# Patient Record
Sex: FEMALE | Race: WHITE | Employment: FULL TIME | URBAN - METROPOLITAN AREA
[De-identification: names, ages, dates, MRNs, and addresses within clinical notes are randomized per-mention and may not be internally consistent; named-entity substitution may affect disease eponyms.]

---

## 2018-04-02 ENCOUNTER — APPOINTMENT (OUTPATIENT)
Dept: GENERAL RADIOLOGY | Age: 33
End: 2018-04-02
Attending: FAMILY MEDICINE
Payer: COMMERCIAL

## 2018-04-02 ENCOUNTER — HOSPITAL ENCOUNTER (OUTPATIENT)
Age: 33
Discharge: HOME | End: 2018-04-02
Attending: FAMILY MEDICINE
Payer: COMMERCIAL

## 2018-04-02 VITALS
SYSTOLIC BLOOD PRESSURE: 135 MMHG | BODY MASS INDEX: 38.32 KG/M2 | WEIGHT: 230 LBS | TEMPERATURE: 98 F | DIASTOLIC BLOOD PRESSURE: 96 MMHG | RESPIRATION RATE: 18 BRPM | HEART RATE: 80 BPM | OXYGEN SATURATION: 100 % | HEIGHT: 65 IN

## 2018-04-02 DIAGNOSIS — J45.21 MILD INTERMITTENT ASTHMATIC BRONCHITIS WITH ACUTE EXACERBATION: Primary | ICD-10-CM

## 2018-04-02 PROCEDURE — 99205 OFFICE O/P NEW HI 60 MIN: CPT

## 2018-04-02 PROCEDURE — 94640 AIRWAY INHALATION TREATMENT: CPT

## 2018-04-02 PROCEDURE — 99204 OFFICE O/P NEW MOD 45 MIN: CPT

## 2018-04-02 PROCEDURE — 71046 X-RAY EXAM CHEST 2 VIEWS: CPT | Performed by: FAMILY MEDICINE

## 2018-04-02 RX ORDER — IPRATROPIUM BROMIDE AND ALBUTEROL SULFATE 2.5; .5 MG/3ML; MG/3ML
3 SOLUTION RESPIRATORY (INHALATION) ONCE
Status: COMPLETED | OUTPATIENT
Start: 2018-04-02 | End: 2018-04-02

## 2018-04-02 RX ORDER — AZITHROMYCIN 250 MG/1
TABLET, FILM COATED ORAL
Qty: 1 PACKAGE | Refills: 0 | Status: SHIPPED | OUTPATIENT
Start: 2018-04-05 | End: 2018-04-02

## 2018-04-02 RX ORDER — PREDNISONE 20 MG/1
40 TABLET ORAL ONCE
Status: COMPLETED | OUTPATIENT
Start: 2018-04-02 | End: 2018-04-02

## 2018-04-02 RX ORDER — ALBUTEROL SULFATE 2.5 MG/3ML
5 SOLUTION RESPIRATORY (INHALATION) ONCE
Status: COMPLETED | OUTPATIENT
Start: 2018-04-02 | End: 2018-04-02

## 2018-04-02 RX ORDER — PREDNISONE 20 MG/1
20 TABLET ORAL 2 TIMES DAILY
Qty: 14 TABLET | Refills: 0 | Status: SHIPPED | OUTPATIENT
Start: 2018-04-03 | End: 2018-04-10

## 2018-04-02 RX ORDER — ALBUTEROL SULFATE 90 UG/1
2 AEROSOL, METERED RESPIRATORY (INHALATION) EVERY 4 HOURS PRN
Qty: 1 INHALER | Refills: 0 | Status: SHIPPED | OUTPATIENT
Start: 2018-04-02 | End: 2018-05-02

## 2018-04-02 RX ORDER — AZITHROMYCIN 250 MG/1
TABLET, FILM COATED ORAL
Qty: 1 PACKAGE | Refills: 0 | Status: SHIPPED | OUTPATIENT
Start: 2018-04-05 | End: 2018-05-01 | Stop reason: ALTCHOICE

## 2018-04-02 NOTE — ED INITIAL ASSESSMENT (HPI)
Pt presents today with c/o non-productive cough that began 1.5-2 weeks ago. Pt states that she has chest pain when she coughs. Pt denies any fevers.

## 2018-04-02 NOTE — ED PROVIDER NOTES
Patient Seen in: [facility]    History   Patient presents with:  Cough/URI    Stated Complaint: wheezing;cough;congestion x30 days    HPI    62-year-old female with a history of asthma and bronchitis presents with coughing, signs.  LAD: No lymphadenopathy. Heart: S1,S2 RRR, No murmur  Lungs: Scattered expiratory wheezes and rhonchi throughout. Skin: Warm and dry  Neuro: A&O x3.  No focal deficits      ED Course   Labs Reviewed - No data to display  FINDINGS:  Normal heart si R-0

## 2018-05-01 ENCOUNTER — OFFICE VISIT (OUTPATIENT)
Dept: INTERNAL MEDICINE CLINIC | Facility: CLINIC | Age: 33
End: 2018-05-01

## 2018-05-01 VITALS
SYSTOLIC BLOOD PRESSURE: 110 MMHG | DIASTOLIC BLOOD PRESSURE: 78 MMHG | WEIGHT: 239 LBS | OXYGEN SATURATION: 99 % | HEART RATE: 74 BPM | HEIGHT: 64.5 IN | TEMPERATURE: 98 F | BODY MASS INDEX: 40.31 KG/M2 | RESPIRATION RATE: 14 BRPM

## 2018-05-01 DIAGNOSIS — J45.20 MILD INTERMITTENT ASTHMA WITHOUT COMPLICATION: ICD-10-CM

## 2018-05-01 DIAGNOSIS — Z00.00 ROUTINE GENERAL MEDICAL EXAMINATION AT A HEALTH CARE FACILITY: ICD-10-CM

## 2018-05-01 DIAGNOSIS — Z12.4 ENCOUNTER FOR SCREENING FOR CERVICAL CANCER: ICD-10-CM

## 2018-05-01 DIAGNOSIS — Z11.3 SCREENING EXAMINATION FOR STD (SEXUALLY TRANSMITTED DISEASE): Primary | ICD-10-CM

## 2018-05-01 PROCEDURE — 99385 PREV VISIT NEW AGE 18-39: CPT | Performed by: INTERNAL MEDICINE

## 2018-05-01 PROCEDURE — 87624 HPV HI-RISK TYP POOLED RSLT: CPT | Performed by: INTERNAL MEDICINE

## 2018-05-01 PROCEDURE — 87591 N.GONORRHOEAE DNA AMP PROB: CPT | Performed by: INTERNAL MEDICINE

## 2018-05-01 PROCEDURE — 87491 CHLMYD TRACH DNA AMP PROBE: CPT | Performed by: INTERNAL MEDICINE

## 2018-05-01 NOTE — PROGRESS NOTES
Randee Baker is a 29year old female  Patient presents with:  Physical      HPI:   She has a hx of dysplasia and was lost to f/u. Last pap clear  She had only biopsies and monitering  She is refusing eval for asthma today, she does not want to pay for it. minor other things   HPI  NEURO: denies headaches or dizziness    EXAM:   /78 (BP Location: Left arm, Patient Position: Sitting, Cuff Size: large)   Pulse 74   Temp 98.2 °F (36.8 °C) (Oral)   Resp 14   Ht 64.5\"   Wt 239 lb   LMP 04/01/2018 (Exact

## 2018-05-03 ENCOUNTER — TELEPHONE (OUTPATIENT)
Dept: INTERNAL MEDICINE CLINIC | Facility: CLINIC | Age: 33
End: 2018-05-03

## 2018-05-03 NOTE — TELEPHONE ENCOUNTER
Spoke to pt, she wants to know if she will need to see gyne if her pap results come back normal.  Advise pap is still in process at this time. This is just FYI so we can let her know if she needs GYNE when we give her the results.

## 2018-05-09 ENCOUNTER — TELEPHONE (OUTPATIENT)
Dept: INTERNAL MEDICINE CLINIC | Facility: CLINIC | Age: 33
End: 2018-05-09

## 2018-05-09 NOTE — TELEPHONE ENCOUNTER
Took call from pt, she is having trouble with asthma. She is using rescue inhaler more than usual.  It's working but not like it normally does for her. She said she has some sputum production with cough in the am but not throughout the day.   Sputum was g

## 2018-05-10 ENCOUNTER — OFFICE VISIT (OUTPATIENT)
Dept: INTERNAL MEDICINE CLINIC | Facility: CLINIC | Age: 33
End: 2018-05-10

## 2018-05-10 VITALS
WEIGHT: 237.5 LBS | HEART RATE: 73 BPM | SYSTOLIC BLOOD PRESSURE: 118 MMHG | TEMPERATURE: 98 F | BODY MASS INDEX: 40.05 KG/M2 | OXYGEN SATURATION: 98 % | DIASTOLIC BLOOD PRESSURE: 78 MMHG | HEIGHT: 64.5 IN

## 2018-05-10 DIAGNOSIS — J45.21 MILD INTERMITTENT ASTHMA WITH ACUTE EXACERBATION: Primary | ICD-10-CM

## 2018-05-10 PROCEDURE — 99213 OFFICE O/P EST LOW 20 MIN: CPT | Performed by: NURSE PRACTITIONER

## 2018-05-10 RX ORDER — LORATADINE 10 MG/1
10 TABLET ORAL DAILY
Qty: 30 TABLET | Refills: 2 | Status: SHIPPED | OUTPATIENT
Start: 2018-05-10 | End: 2019-02-05

## 2018-05-10 RX ORDER — ALBUTEROL SULFATE 90 UG/1
AEROSOL, METERED RESPIRATORY (INHALATION) EVERY 6 HOURS PRN
COMMUNITY
End: 2020-04-07

## 2018-05-10 RX ORDER — PREDNISONE 20 MG/1
20 TABLET ORAL DAILY
Qty: 5 TABLET | Refills: 0 | Status: SHIPPED | OUTPATIENT
Start: 2018-05-10 | End: 2018-05-19 | Stop reason: ALTCHOICE

## 2018-05-10 NOTE — PROGRESS NOTES
Patient presents with:  Shortness Of Breath: Pt. is c/o wheezing x 1 month on and off and possibly allergies due to watery eyes      HPI:  Presents with approx 1 month history of SOB worse with activity, wheezing and coughing.  Stated wheezing is worse at n are pink and moist without exudate or drainage. PERRLA. Lymphadenopathy: No cervical adenopathy. Cardiovascular: Normal rate, regular rhythm. No murmur. Pulmonary/Chest: No respiratory distress.  Effort normal. Breath sounds noted with expiratory whee

## 2018-05-19 ENCOUNTER — OFFICE VISIT (OUTPATIENT)
Dept: INTERNAL MEDICINE CLINIC | Facility: CLINIC | Age: 33
End: 2018-05-19

## 2018-05-19 VITALS
WEIGHT: 238 LBS | SYSTOLIC BLOOD PRESSURE: 110 MMHG | HEART RATE: 80 BPM | BODY MASS INDEX: 40 KG/M2 | DIASTOLIC BLOOD PRESSURE: 80 MMHG | RESPIRATION RATE: 16 BRPM | TEMPERATURE: 97 F

## 2018-05-19 DIAGNOSIS — J01.90 ACUTE SINUSITIS, RECURRENCE NOT SPECIFIED, UNSPECIFIED LOCATION: ICD-10-CM

## 2018-05-19 DIAGNOSIS — J45.901 ACUTE EXACERBATION OF ASTHMA WITH ALLERGIC RHINITIS: Primary | ICD-10-CM

## 2018-05-19 PROCEDURE — 99213 OFFICE O/P EST LOW 20 MIN: CPT | Performed by: INTERNAL MEDICINE

## 2018-05-19 RX ORDER — MONTELUKAST SODIUM 10 MG/1
10 TABLET ORAL NIGHTLY
Qty: 30 TABLET | Refills: 2 | Status: SHIPPED | OUTPATIENT
Start: 2018-05-19 | End: 2019-02-05

## 2018-05-19 RX ORDER — AMOXICILLIN AND CLAVULANATE POTASSIUM 875; 125 MG/1; MG/1
1 TABLET, FILM COATED ORAL 2 TIMES DAILY
Qty: 20 TABLET | Refills: 0 | Status: SHIPPED | OUTPATIENT
Start: 2018-05-19 | End: 2018-06-05 | Stop reason: ALTCHOICE

## 2018-05-19 RX ORDER — FLUTICASONE PROPIONATE AND SALMETEROL 500; 50 UG/1; UG/1
1 POWDER RESPIRATORY (INHALATION) 2 TIMES DAILY
Qty: 14 EACH | Refills: 0 | COMMUNITY
Start: 2018-05-19 | End: 2018-06-05

## 2018-05-19 NOTE — PROGRESS NOTES
Radha Beard is a 29year old female.  To F/U from last visit regarding asthma  HPI:    Interim history:she has a hx of URI and allergy induced asthma, intermittent treatment only, since childhood, exacerbation this past month w/URI and then continued rhin (Oral)   Resp 16   Wt 238 lb   LMP 05/03/2018 (Exact Date)   BMI 40.22 kg/m²   GENERAL: well developed, well nourished,in no apparent distress  SKIN: no rashes,no suspicious lesions  HEENT: atraumatic, normocephalic,ears and throat are clear, no pallor or

## 2018-06-05 ENCOUNTER — OFFICE VISIT (OUTPATIENT)
Dept: INTERNAL MEDICINE CLINIC | Facility: CLINIC | Age: 33
End: 2018-06-05

## 2018-06-05 VITALS
HEART RATE: 67 BPM | DIASTOLIC BLOOD PRESSURE: 84 MMHG | RESPIRATION RATE: 14 BRPM | HEIGHT: 64.5 IN | SYSTOLIC BLOOD PRESSURE: 108 MMHG | WEIGHT: 243 LBS | BODY MASS INDEX: 40.98 KG/M2 | TEMPERATURE: 99 F | OXYGEN SATURATION: 99 %

## 2018-06-05 DIAGNOSIS — J45.20 MILD INTERMITTENT ASTHMA WITHOUT COMPLICATION: ICD-10-CM

## 2018-06-05 DIAGNOSIS — R05.9 COUGH: Primary | ICD-10-CM

## 2018-06-05 PROCEDURE — 99213 OFFICE O/P EST LOW 20 MIN: CPT | Performed by: INTERNAL MEDICINE

## 2018-06-05 RX ORDER — FLUTICASONE PROPIONATE AND SALMETEROL 250; 50 UG/1; UG/1
1 POWDER RESPIRATORY (INHALATION) 2 TIMES DAILY
Qty: 1 EACH | Refills: 0 | Status: SHIPPED | OUTPATIENT
Start: 2018-06-05 | End: 2019-02-05

## 2018-06-05 RX ORDER — PANTOPRAZOLE SODIUM 40 MG/1
40 TABLET, DELAYED RELEASE ORAL
Qty: 30 TABLET | Refills: 0 | Status: SHIPPED | OUTPATIENT
Start: 2018-06-05 | End: 2019-02-05

## 2018-06-05 NOTE — PATIENT INSTRUCTIONS
Tips to Control Acid Reflux    To control acid reflux, you’ll need to make some basic diet and lifestyle changes. The simple steps outlined below may be all you’ll need to ease discomfort. Watch what you eat  · Avoid fatty foods and spicy foods.   · Eat Symptoms of reflux include burning, pressure or sharp pain in the upper abdomen or mid to lower chest. The pain can spread to the neck, back, or shoulder.  There may be belching, an acid taste in the back of the throat, chronic cough, or sore throat or hoar Take an antacid 30-60 minutes after eating and at bedtime, but not at the same time as an acid blocker. Try not to take medicines such as ibuprofen and aspirin.  If you are taking aspirin for your heart or other medical reasons, talk to your healthcare pro · Don’t just prop your head on several pillows. This increases pressure on your stomach. It can make GERD worse. Watch your eating habits  Certain foods may increase the acid in your stomach or relax the lower esophageal sphincter.  This makes GERD more li Antacids work to weaken the acid in your stomach and can give you quick relief. You can buy many of them with no prescription. Antacids can be high in sodium. This may be a problem if you have high blood pressure. Some antacids also have aluminum.  This marcia Don’t take aspirin without your provider’s approval. And don’t take a nonsteroidal anti-inflammatory drug (NSAID), such as ibuprofen. These reduce the protective lining of your stomach. This can lead to more GERD symptoms.  Check with your provider or pharm You and your healthcare provider can work together to find the treatment options that best ease your symptoms. These may include lifestyle changes, medicine, and possibly surgery.   Many people find their GERD symptoms decrease when they eat small frequent

## 2018-06-05 NOTE — PROGRESS NOTES
Karen Elliott is a 29year old female.  To F/U from last visit regarding cough /asthma  HPI:    Interim history:we treatead 2 weeks ago w/course of abx, advair high dose and singulari, flonase and claritin  Her ACT is much better,- went from 13 to  23, her GI: denies abdominal pain and denies heartburn, change in appetite or bm's  NEURO: denies headaches or dizziness    EXAM:   /84 (BP Location: Left arm, Patient Position: Sitting, Cuff Size: large)   Pulse 67   Temp 98.6 °F (37 °C) (Oral)   Resp 14 results can occur. Regular sampling is recommended to minimize false   negative results.  [facility] REMOVED]    Case Report Gynecologic Cytology                              Case: [id number]                                  Authorizing Provider:  Malinda Harding Tips to Control Acid Reflux    To control acid reflux, you’ll need to make some basic diet and lifestyle changes. The simple steps outlined below may be all you’ll need to ease discomfort. Watch what you eat  · Avoid fatty foods and spicy foods.   · Ea Symptoms of reflux include burning, pressure or sharp pain in the upper abdomen or mid to lower chest. The pain can spread to the neck, back, or shoulder.  There may be belching, an acid taste in the back of the throat, chronic cough, or sore throat or hoar Take an antacid 30-60 minutes after eating and at bedtime, but not at the same time as an acid blocker. Try not to take medicines such as ibuprofen and aspirin.  If you are taking aspirin for your heart or other medical reasons, talk to your healthcare pro · Don’t just prop your head on several pillows. This increases pressure on your stomach. It can make GERD worse. Watch your eating habits  Certain foods may increase the acid in your stomach or relax the lower esophageal sphincter.  This makes GERD more li Antacids work to weaken the acid in your stomach and can give you quick relief. You can buy many of them with no prescription. Antacids can be high in sodium. This may be a problem if you have high blood pressure. Some antacids also have aluminum.  This marcia Don’t take aspirin without your provider’s approval. And don’t take a nonsteroidal anti-inflammatory drug (NSAID), such as ibuprofen. These reduce the protective lining of your stomach. This can lead to more GERD symptoms.  Check with your provider or pharm You and your healthcare provider can work together to find the treatment options that best ease your symptoms. These may include lifestyle changes, medicine, and possibly surgery.   Many people find their GERD symptoms decrease when they eat small frequent

## 2019-02-05 ENCOUNTER — OFFICE VISIT (OUTPATIENT)
Dept: INTERNAL MEDICINE CLINIC | Facility: CLINIC | Age: 34
End: 2019-02-05
Payer: COMMERCIAL

## 2019-02-05 VITALS
OXYGEN SATURATION: 98 % | BODY MASS INDEX: 41.01 KG/M2 | RESPIRATION RATE: 16 BRPM | WEIGHT: 243.19 LBS | HEART RATE: 80 BPM | HEIGHT: 64.5 IN | TEMPERATURE: 98 F | SYSTOLIC BLOOD PRESSURE: 122 MMHG | DIASTOLIC BLOOD PRESSURE: 80 MMHG

## 2019-02-05 DIAGNOSIS — Z30.011 BCP (BIRTH CONTROL PILLS) INITIATION: Primary | ICD-10-CM

## 2019-02-05 PROCEDURE — 99213 OFFICE O/P EST LOW 20 MIN: CPT | Performed by: INTERNAL MEDICINE

## 2019-02-05 RX ORDER — NORGESTIMATE AND ETHINYL ESTRADIOL 0.25-0.035
1 KIT ORAL DAILY
Qty: 3 PACKAGE | Refills: 3 | Status: SHIPPED | OUTPATIENT
Start: 2019-02-05 | End: 2020-04-03

## 2019-02-05 NOTE — PROGRESS NOTES
Upton Friend is a 34year old female  Patient presents with:  Contraception: Wants a Script until May 2019      HPI:   She would like a short term Rx for OCPs  Her former partner is coming back temporarily  She has been on OCPs in the past and had some di cells present    General Categorization Negative for intraepithelial lesion or malignancy       Final Diagnosis Comment       Negative for intraepithelial lesion or malignancy        HPV High Risk mRNA       Negative  Normal        Recommendations/Comments types were placed in this encounter. Meds & Refills for this Visit:  Requested Prescriptions     Signed Prescriptions Disp Refills   • Norgestimate-Eth Estradiol (SPRINTEC 28) 0.25-35 MG-MCG Oral Tab 3 Package 3     Sig: Take 1 tablet by mouth daily.

## 2020-04-03 RX ORDER — NORGESTIMATE AND ETHINYL ESTRADIOL 0.25-0.035
KIT ORAL
Qty: 28 TABLET | Refills: 0 | Status: SHIPPED | OUTPATIENT
Start: 2020-04-03 | End: 2020-05-01

## 2020-04-07 DIAGNOSIS — J45.20 MILD INTERMITTENT ASTHMA WITHOUT COMPLICATION: Primary | ICD-10-CM

## 2020-04-07 NOTE — TELEPHONE ENCOUNTER
Did the patient contact the pharmacy directly?:  Yes they told her to call us    Is patient out of meds or supply very low? : 1 week left about    Medication Requested:  Albuterol Sulfate HFA (PROAIR HFA) 108 (90 Base) MCG/ACT Inhalation Aero Soln    Dose:

## 2020-04-08 NOTE — TELEPHONE ENCOUNTER
Last OV relevant to medication: 6/5/2018 - Asthma, 2/5/2019 - BC  Last refill date: 4/2/2018     #/refills: 1 inh/0  When pt was asked to return for OV: n/a  Upcoming appt/reason: 6/20/2020 - PE

## 2020-04-14 RX ORDER — ALBUTEROL SULFATE 90 UG/1
2 AEROSOL, METERED RESPIRATORY (INHALATION) EVERY 6 HOURS PRN
Qty: 1 INHALER | Refills: 0 | Status: SHIPPED | OUTPATIENT
Start: 2020-04-14 | End: 2021-06-08

## 2020-05-01 RX ORDER — NORGESTIMATE AND ETHINYL ESTRADIOL 0.25-0.035
KIT ORAL
Qty: 84 TABLET | Refills: 0 | Status: SHIPPED | OUTPATIENT
Start: 2020-05-01 | End: 2020-07-21

## 2020-07-20 DIAGNOSIS — Z30.41 ORAL CONTRACEPTIVE USE: ICD-10-CM

## 2020-07-20 DIAGNOSIS — Z30.011 BCP (BIRTH CONTROL PILLS) INITIATION: Primary | ICD-10-CM

## 2020-07-21 RX ORDER — NORGESTIMATE AND ETHINYL ESTRADIOL 0.25-0.035
KIT ORAL
Qty: 84 TABLET | Refills: 0 | Status: SHIPPED | OUTPATIENT
Start: 2020-07-21 | End: 2021-06-08

## 2020-11-19 ENCOUNTER — MED REC SCAN ONLY (OUTPATIENT)
Dept: INTERNAL MEDICINE CLINIC | Facility: CLINIC | Age: 35
End: 2020-11-19

## 2021-05-11 ENCOUNTER — MED REC SCAN ONLY (OUTPATIENT)
Dept: INTERNAL MEDICINE CLINIC | Facility: CLINIC | Age: 36
End: 2021-05-11

## 2021-06-08 ENCOUNTER — OFFICE VISIT (OUTPATIENT)
Dept: INTERNAL MEDICINE CLINIC | Facility: CLINIC | Age: 36
End: 2021-06-08
Payer: COMMERCIAL

## 2021-06-08 VITALS
BODY MASS INDEX: 35.99 KG/M2 | SYSTOLIC BLOOD PRESSURE: 110 MMHG | OXYGEN SATURATION: 98 % | WEIGHT: 210.81 LBS | DIASTOLIC BLOOD PRESSURE: 72 MMHG | HEART RATE: 83 BPM | HEIGHT: 64.37 IN | TEMPERATURE: 98 F | RESPIRATION RATE: 14 BRPM

## 2021-06-08 DIAGNOSIS — L29.2 VULVAR PRURITUS: ICD-10-CM

## 2021-06-08 DIAGNOSIS — J45.20 MILD INTERMITTENT ASTHMA WITHOUT COMPLICATION: ICD-10-CM

## 2021-06-08 DIAGNOSIS — Z30.41 ORAL CONTRACEPTIVE USE: ICD-10-CM

## 2021-06-08 DIAGNOSIS — F32.A ANXIETY AND DEPRESSION: ICD-10-CM

## 2021-06-08 DIAGNOSIS — F41.9 ANXIETY AND DEPRESSION: ICD-10-CM

## 2021-06-08 DIAGNOSIS — Z11.3 SCREENING FOR STD (SEXUALLY TRANSMITTED DISEASE): ICD-10-CM

## 2021-06-08 DIAGNOSIS — Z00.00 ROUTINE GENERAL MEDICAL EXAMINATION AT A HEALTH CARE FACILITY: Primary | ICD-10-CM

## 2021-06-08 PROCEDURE — 3078F DIAST BP <80 MM HG: CPT | Performed by: INTERNAL MEDICINE

## 2021-06-08 PROCEDURE — 3074F SYST BP LT 130 MM HG: CPT | Performed by: INTERNAL MEDICINE

## 2021-06-08 PROCEDURE — 99395 PREV VISIT EST AGE 18-39: CPT | Performed by: INTERNAL MEDICINE

## 2021-06-08 PROCEDURE — 87480 CANDIDA DNA DIR PROBE: CPT | Performed by: INTERNAL MEDICINE

## 2021-06-08 PROCEDURE — 87510 GARDNER VAG DNA DIR PROBE: CPT | Performed by: INTERNAL MEDICINE

## 2021-06-08 PROCEDURE — 99000 SPECIMEN HANDLING OFFICE-LAB: CPT | Performed by: INTERNAL MEDICINE

## 2021-06-08 PROCEDURE — 87660 TRICHOMONAS VAGIN DIR PROBE: CPT | Performed by: INTERNAL MEDICINE

## 2021-06-08 PROCEDURE — 99213 OFFICE O/P EST LOW 20 MIN: CPT | Performed by: INTERNAL MEDICINE

## 2021-06-08 PROCEDURE — 3008F BODY MASS INDEX DOCD: CPT | Performed by: INTERNAL MEDICINE

## 2021-06-08 RX ORDER — NORGESTIMATE AND ETHINYL ESTRADIOL 0.25-0.035
1 KIT ORAL DAILY
Qty: 84 TABLET | Refills: 3 | Status: SHIPPED | OUTPATIENT
Start: 2021-06-08

## 2021-06-08 RX ORDER — ALBUTEROL SULFATE 90 UG/1
2 AEROSOL, METERED RESPIRATORY (INHALATION) EVERY 6 HOURS PRN
Qty: 1 EACH | Refills: 1 | Status: SHIPPED | OUTPATIENT
Start: 2021-06-08 | End: 2021-06-10

## 2021-06-08 NOTE — PATIENT INSTRUCTIONS
Get your labs done. You should be fasting for at least 10 hours. You may drink water up until the time of your appt. If you take a multivitamin with Biotin or any biotin product it should be held for 3 days prior to getting your labs done.   If you use Edw
49865 Exp Problem Focused - Mod. Complex

## 2021-06-08 NOTE — PROGRESS NOTES
HPI:   Margot Trivedi is a 37year old female who presents for a complete physical exam. . also intermittent extrinsic asthma and URI induced, we enterntained the thought of LRPD in the past and started PPI but lost to f/u.  Also was on singulair and advair a dysplasia, resolved on last pap, no hx of STds  OCPs uses off and on depending on when she sees her partner in [place], long distance relationship for years  Sexually active: yes  Exercise: no         REVIEW OF SYSTEMS:     GENERAL denies night sweats, weight l no rashes,no suspicious lesions.  Lentigo appearance of 4x3 mm flat light brown macule right cheek  HEENT: atraumatic, normocephalic,ears and throat are clear  EYES:PERRLA, EOMI, ,conjunctiva are clear, no scleral icterus  NECK: supple,no adenopathy,no brui CASCADE; Future  - HIV AG AB COMBO; Future  - CHLAMYDIA/GONOCOCCUS BY PCR; Future  - HEPATITIS B SURFACE ANTIBODY; Future  - HEPATITIS B SURFACE ANTIGEN; Future  - HCV ANTIBODY; Future    4.  Oral contraceptive use  - Norgestimate-Eth Estradiol (ESTARYLLA)

## 2021-06-10 DIAGNOSIS — J45.20 MILD INTERMITTENT ASTHMA WITHOUT COMPLICATION: ICD-10-CM

## 2021-06-10 RX ORDER — ALBUTEROL SULFATE 90 UG/1
2 AEROSOL, METERED RESPIRATORY (INHALATION) EVERY 6 HOURS PRN
Qty: 3 EACH | Refills: 0 | Status: SHIPPED | OUTPATIENT
Start: 2021-06-10

## 2021-06-15 PROBLEM — F41.8 DEPRESSION WITH ANXIETY: Status: ACTIVE | Noted: 2021-06-15

## 2021-06-15 NOTE — PATIENT INSTRUCTIONS
Bupropion HCl 24 HR Extended Release Tablet 300 mg  Uses  This medicine is used for the following purposes:  · attention deficit hyperactivity disorder  · depression  · stop smoking  Instructions  Swallow the medicine without crushing or chewing it.   Ismael Stewards doctor if there are any signs of confusion or unusual changes in behavior. Tell the doctor or pharmacist if you are pregnant, planning to be pregnant, or breastfeeding. Ask your pharmacist if this medicine can interact with any of your other medicines.  B help right away if you notice any of these more serious side effects:  · chest pain  · fast or irregular heart beats  · dilation of the pupils  · seizures  · shortness of breath  A few people may have an allergic reactions to this medicine.  Symptoms can in

## 2021-06-15 NOTE — PROGRESS NOTES
Camila Huber is a 37year old female  Patient presents with:   Follow - Up: Mental Health Update      HPI:   I saw her last week for a PE and she mentioned she needed help for recurrent anxiety /depressin and we schduled a dedicated OV to that for today MG Oral Tab Take 200 mg by mouth every 6 (six) hours as needed.         Patient Active Problem List:     Antonio     Mild intermittent asthma without complication     Social History:  Social History    Tobacco Use      Smoking status: Never Smoker mg  Uses  This medicine is used for the following purposes:  · attention deficit hyperactivity disorder  · depression  · stop smoking  Instructions  Swallow the medicine without crushing or chewing it. This medicine may be taken with or without food.   Try unusual changes in behavior. Tell the doctor or pharmacist if you are pregnant, planning to be pregnant, or breastfeeding. Ask your pharmacist if this medicine can interact with any of your other medicines.  Be sure to tell them about all the medicines yo serious side effects:  · chest pain  · fast or irregular heart beats  · dilation of the pupils  · seizures  · shortness of breath  A few people may have an allergic reactions to this medicine.  Symptoms can include difficulty breathing, skin rash, itching,

## 2021-06-23 ENCOUNTER — MED REC SCAN ONLY (OUTPATIENT)
Dept: INTERNAL MEDICINE CLINIC | Facility: CLINIC | Age: 36
End: 2021-06-23

## 2021-07-15 ENCOUNTER — TELEPHONE (OUTPATIENT)
Dept: INTERNAL MEDICINE CLINIC | Facility: CLINIC | Age: 36
End: 2021-07-15

## 2021-07-15 PROBLEM — F51.01 PRIMARY INSOMNIA: Status: ACTIVE | Noted: 2021-07-15

## 2021-07-15 NOTE — PROGRESS NOTES
Makenzie Trivedi is a 37year old female. To F/U from last vis it regarding depressive episode w/anxiety  HPI:    Interim history:was here 1 month ago with c/o recurrence of anxiety and depression.  Was withdrawing and isolating and feeling anxious and having Per EMS, patient was walking and tripped over his feet. No LOC, per pt's nurse patient is at his baseline, a&ox1.   how to arrange medications when she moves which won't be until January.  She wonders how she will tie up all of her loose ends in order to move  She mentions frequently how much she has on her plate  She states she is firm and nonconflicted regarding her mo

## 2021-07-15 NOTE — TELEPHONE ENCOUNTER
Patient scheduled a virtual visit for her short term follow up. Virtual/Telephone Consent    Danelle Genna verbally consents to a Virtual/Telephone Check-In service on 9/11/21.   Patient understands and accepts financial responsibility for any deductib

## 2021-08-19 DIAGNOSIS — F41.8 DEPRESSION WITH ANXIETY: ICD-10-CM

## 2021-08-19 RX ORDER — BUPROPION HYDROCHLORIDE 300 MG/1
300 TABLET ORAL DAILY
Qty: 30 TABLET | Refills: 1 | Status: CANCELLED | OUTPATIENT
Start: 2021-08-19

## 2021-08-20 NOTE — TELEPHONE ENCOUNTER
Last OV relevant to medication: 7/15/21  Last refill date: 7/15/21     #/refills: 30/1  When pt was asked to return for OV: 2 months  Upcoming appt/reason: 9/11/21, 2 month followup  Was pt informed of any over due labs: yes, Alexandrot sent

## 2021-08-21 RX ORDER — BUPROPION HYDROCHLORIDE 300 MG/1
300 TABLET ORAL DAILY
Qty: 30 TABLET | Refills: 0 | Status: SHIPPED | OUTPATIENT
Start: 2021-08-21

## 2021-09-11 PROBLEM — F41.8 DEPRESSION WITH ANXIETY: Status: RESOLVED | Noted: 2021-06-15 | Resolved: 2021-09-11

## 2021-09-11 NOTE — PROGRESS NOTES
This visit is conducted using Telemedicine with live, interactive video and audio. Patient has been referred to the [facility] website at [email or web address] to review the yearly Consent to Treat document.     Patient understands and accepts financial res intermittent asthma without complication     Depression with anxiety     Primary insomnia

## 2021-10-04 ENCOUNTER — TELEPHONE (OUTPATIENT)
Dept: INTERNAL MEDICINE CLINIC | Facility: CLINIC | Age: 36
End: 2021-10-04

## 2021-10-04 ENCOUNTER — NURSE ONLY (OUTPATIENT)
Dept: INTERNAL MEDICINE CLINIC | Facility: CLINIC | Age: 36
End: 2021-10-04
Payer: COMMERCIAL

## 2021-10-04 ENCOUNTER — PATIENT MESSAGE (OUTPATIENT)
Dept: INTERNAL MEDICINE CLINIC | Facility: CLINIC | Age: 36
End: 2021-10-04

## 2021-10-04 DIAGNOSIS — Z11.1 ENCOUNTER FOR PPD TEST: Primary | ICD-10-CM

## 2021-10-04 DIAGNOSIS — Z11.1 ENCOUNTER FOR PPD SKIN TEST READING: ICD-10-CM

## 2021-10-04 PROCEDURE — 86580 TB INTRADERMAL TEST: CPT | Performed by: INTERNAL MEDICINE

## 2021-10-04 NOTE — TELEPHONE ENCOUNTER
See notes below. Pt scheduled nurse visit for PPD. Pending PPD for approval.  Pt sent form in Patient Message encounter.   PE done in June

## 2021-10-04 NOTE — TELEPHONE ENCOUNTER
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 In June I had a

## 2021-10-04 NOTE — TELEPHONE ENCOUNTER
Form printed. Completed as able per 6/8/21 PE.   To Dr. Oli Worthy for signature  Pt coming today at 1:00PM for TB test.

## 2021-10-04 NOTE — TELEPHONE ENCOUNTER
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 She had a physical in June and also needs a TB test.  Should we schedule an OV or a nurse visit?    Please adv

## 2021-10-05 ENCOUNTER — TELEPHONE (OUTPATIENT)
Dept: INTERNAL MEDICINE CLINIC | Facility: CLINIC | Age: 36
End: 2021-10-05

## 2021-10-06 ENCOUNTER — NURSE ONLY (OUTPATIENT)
Dept: INTERNAL MEDICINE CLINIC | Facility: CLINIC | Age: 36
End: 2021-10-06
Payer: COMMERCIAL

## 2021-10-06 NOTE — TELEPHONE ENCOUNTER
Pt here for TB read  Pt does not have fax number  Pt will come and  form sometime this week  Form to be signed and place in front for   To  for signature

## 2021-10-06 NOTE — TELEPHONE ENCOUNTER
Patient can not come in tomorrow. She will come in as planned today and will bring the fax number for the form.

## 2021-10-06 NOTE — TELEPHONE ENCOUNTER
See my [employer] message to pt. PSR - Please call pt if she can r/s nurse visit to tomorrow before 1:00PM. Thanks! Or if can only come today, find out fax number we can send form to once Dr. Priyanka Willis signs.

## 2021-10-08 ENCOUNTER — MED REC SCAN ONLY (OUTPATIENT)
Dept: INTERNAL MEDICINE CLINIC | Facility: CLINIC | Age: 36
End: 2021-10-08

## 2021-12-22 ENCOUNTER — TELEPHONE (OUTPATIENT)
Dept: INTERNAL MEDICINE CLINIC | Facility: CLINIC | Age: 36
End: 2021-12-22

## 2021-12-22 ENCOUNTER — TELEMEDICINE (OUTPATIENT)
Dept: INTERNAL MEDICINE CLINIC | Facility: CLINIC | Age: 36
End: 2021-12-22
Payer: COMMERCIAL

## 2021-12-22 VITALS — BODY MASS INDEX: 35 KG/M2 | HEIGHT: 64.37 IN

## 2021-12-22 DIAGNOSIS — Z20.822 EXPOSURE TO COVID-19 VIRUS: Primary | ICD-10-CM

## 2021-12-22 DIAGNOSIS — J45.20 MILD INTERMITTENT ASTHMA WITHOUT COMPLICATION: ICD-10-CM

## 2021-12-22 PROCEDURE — 99213 OFFICE O/P EST LOW 20 MIN: CPT | Performed by: NURSE PRACTITIONER

## 2021-12-22 NOTE — PATIENT INSTRUCTIONS
Call central scheduling at [phone] to schedule your COVID test.    Quarantine for 14 days. Follow all CDC precautions such as frequent handwashing, wearing a mask, etc.    Go to ER if you develop SOB, oxygen saturation <92% persistently, or CP.     Florentino Herrera notify your family and friends with whom you have had close contact recently (starting 2 days before you first had symptoms). What counts as close contact?     * You were within 6 feet of someone who has COVID-19 for at least 15 minutes  * You provided transportation, ridesharing, or taxis. 2. Monitor your symptoms carefully. If your symptoms get worse, call your healthcare provider immediately. 3. Get rest and stay hydrated.    4. If you have a medical appointment, call the healthcare provider ahead o passed since recovery defined as resolution of fever without the use of fever-reducing medications; and  · Improvement in respiratory symptoms (e.g., cough, shortness of breath); and  · At least 10 days have passed since symptoms first appeared OR if asymp plasma is very similar to donating blood. Deirdre Navarro (a large blood research institute in [facility] and one of [facility]’s blood product suppliers) is coordinating plasma donations.     If you would be interested in donating your plasma to help treat o fog or trouble concentrating   Headaches Sleeping difficulties   Anxiety or depression Loss of taste or smell   Chest pain  Palpitations Light headedness  Muscle Pain   Increased Heart Rate Tingling, numbness, or burning sensation   Shortness of breath Victoriano Jorgensen

## 2021-12-22 NOTE — PROGRESS NOTES
[address] [place] [address]  verbally consents to a [employer] visit on 12/22/21. Patient has been referred to the [facility] website at [email or web address] to review the yearly Consent to Treat document.     P • CERVICAL DYSPLASIA    • Depression    • Depression with anxiety 6/15/2021   • Fracture of ankle    • Mild dysplasia of cervix 12/24/2014   • Mild intermittent asthma without complication 6/1/1564      Social History:  Social History    Tobacco Use The patient was also advised of the potential privacy & security concerns related to the telehealth platform. Lastly, the patient confirmed that they were in [place].      Please note that the following visit was completed using two-way, real-time inte

## 2021-12-22 NOTE — TELEPHONE ENCOUNTER
Virtual/Telephone Consent    Lissa Jefferson verbally {consents to a Virtual/Telephone Check-In service on 12/22/21. Patient understands and accepts financial responsibility for any deductible, co-insurance and/or co-pays associated with this service.     Pt

## 2022-02-19 ENCOUNTER — TELEPHONE (OUTPATIENT)
Dept: INTERNAL MEDICINE CLINIC | Facility: CLINIC | Age: 37
End: 2022-02-19

## 2022-02-21 RX ORDER — BUPROPION HYDROCHLORIDE 300 MG/1
300 TABLET ORAL DAILY
Qty: 90 TABLET | Refills: 0 | Status: SHIPPED | OUTPATIENT
Start: 2022-02-21

## 2022-02-21 NOTE — TELEPHONE ENCOUNTER
Called pt and informed her a follow up is needed. 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 I informed her on the below information. Informed pt I will reach back out. Pt stated she has been getting regular refills monthly. As documented below. I called the pharmacy to confirm their RX sent over   Pharmacy stated a refill was sent on 01/18/2021 and picked up on 01/19/2021  More recently a refill was picked up on 02/19/2022.   Last documented in Epic is 8/21/2021  Pharmacy is faxing over refills for review

## 2022-02-21 NOTE — TELEPHONE ENCOUNTER
Left message to pt to call back, need to verify if pt still taking bupropion. Per ov 9-11-21:   Can discontinue bupropion if she would like.         Please review refill protocol failed/ no protocol  Requested Prescriptions   Pending Prescriptions Disp Refills    BUPROPION 300 MG Oral Tablet 24 Hr [Pharmacy Med Name: BUPROPION XL 300MG TABLETS] 90 tablet 0     Sig: TAKE 1 TABLET(300 MG) BY MOUTH DAILY        There is no refill protocol information for this order              Last OV relevant to medication: 9-11-21  Last refill date: 8-21-21   #/refills: 30  When pt was asked to return for OV: no  Upcoming appt/reason: none  Was pt informed of any over due: no  Recent labs: n/a

## 2022-02-21 NOTE — TELEPHONE ENCOUNTER
Patient returned call and confirmed she is still taking Bupropion 300mg daily. Patient is requesting a 90 day supply with 1 refill.

## 2022-02-22 NOTE — TELEPHONE ENCOUNTER
Pt made appt for physical and med check. Calling insurance to see if she can do her physical early as she [place] be [place] of [place] this [place] when her physical will be due. Pt stated she will call back to update up.  appt made for 3/14/2022 with Dr Christiano Solis

## 2022-03-14 ENCOUNTER — TELEPHONE (OUTPATIENT)
Dept: INTERNAL MEDICINE CLINIC | Facility: CLINIC | Age: 37
End: 2022-03-14

## 2022-03-14 NOTE — TELEPHONE ENCOUNTER
Called and spoke with Caitie Lizarraga at [facility], she states it looks like July prescription was inputted incorrectly with 6 refills instead of 1. No more refills remain on that prescription, just the most recent sent 2/21/22 for #90.

## 2022-03-14 NOTE — TELEPHONE ENCOUNTER
Please call [facility] in [place]   We never gave a Rx for bupropion that I can see after august 2021 and she has been getting refills up until February 2022

## 2022-03-25 ENCOUNTER — TELEPHONE (OUTPATIENT)
Dept: INTERNAL MEDICINE CLINIC | Facility: CLINIC | Age: 37
End: 2022-03-25

## 2022-03-25 NOTE — TELEPHONE ENCOUNTER
Care Everywhere Records Received    From [address] Provider: Dr Reddy General    Speciality:Neurotology    Type of Record: OV on 3/18/22

## 2022-03-28 RX ORDER — NORGESTIMATE AND ETHINYL ESTRADIOL 0.25-0.035
KIT ORAL
Qty: 84 TABLET | Refills: 0 | Status: SHIPPED | OUTPATIENT
Start: 2022-03-28

## 2022-03-28 NOTE — TELEPHONE ENCOUNTER
Gynecology Medication Protocol Passed 03/27/2022 08:06 AM    PASS-PENDING LAST PAP WNL--VIA MANUAL LOOKUP    Physical or Pelvic/Breast in past 12 or next 3 mos--VIA MANUAL LOOKUP     No future appointments.   F/U in 6/14/22

## 2022-05-17 DIAGNOSIS — F41.8 DEPRESSION WITH ANXIETY: ICD-10-CM

## 2022-05-17 DIAGNOSIS — Z11.3 SCREENING FOR STD (SEXUALLY TRANSMITTED DISEASE): ICD-10-CM

## 2022-05-17 DIAGNOSIS — Z00.00 ANNUAL PHYSICAL EXAM: Primary | ICD-10-CM

## 2022-05-17 RX ORDER — BUPROPION HYDROCHLORIDE 300 MG/1
300 TABLET ORAL DAILY
Qty: 90 TABLET | Refills: 0 | Status: SHIPPED | OUTPATIENT
Start: 2022-05-17

## 2022-05-17 NOTE — TELEPHONE ENCOUNTER
PLEASE SEE ADDITIONAL NOTE:      Did the patient contact the pharmacy directly?:  Yes - no refills    Is patient out of meds or supply very low?:  Very low    Medication Requested:  buPROPion Oral Tablet 24 Hr    Dose:  300 MG    Is patient requesting a 30 or 90 day supply?:  90    Pharmacy name and phone # or location:  [facility] [address], [address], [phone], [phone]    Is the patient due for an appointment?:  Physical scheduled for 8/22/22  (if so, please schedule appt)    Additional Notes:  Patient is in [place] until August and scheduled her physical at that time.   Please send one 90 day supply to the [facility] in [place], [place]

## 2022-08-22 ENCOUNTER — OFFICE VISIT (OUTPATIENT)
Dept: INTERNAL MEDICINE CLINIC | Facility: CLINIC | Age: 37
End: 2022-08-22
Payer: COMMERCIAL

## 2022-08-22 VITALS
HEIGHT: 64.72 IN | DIASTOLIC BLOOD PRESSURE: 68 MMHG | OXYGEN SATURATION: 98 % | SYSTOLIC BLOOD PRESSURE: 116 MMHG | RESPIRATION RATE: 16 BRPM | BODY MASS INDEX: 37.17 KG/M2 | TEMPERATURE: 97 F | HEART RATE: 83 BPM | WEIGHT: 220.38 LBS

## 2022-08-22 DIAGNOSIS — Z12.4 CERVICAL CANCER SCREENING: ICD-10-CM

## 2022-08-22 DIAGNOSIS — Z00.00 ROUTINE GENERAL MEDICAL EXAMINATION AT A HEALTH CARE FACILITY: Primary | ICD-10-CM

## 2022-08-22 DIAGNOSIS — F41.8 DEPRESSION WITH ANXIETY: ICD-10-CM

## 2022-08-22 DIAGNOSIS — J45.20 MILD INTERMITTENT ASTHMA WITHOUT COMPLICATION: ICD-10-CM

## 2022-08-22 PROCEDURE — 99395 PREV VISIT EST AGE 18-39: CPT | Performed by: INTERNAL MEDICINE

## 2022-08-22 PROCEDURE — 3008F BODY MASS INDEX DOCD: CPT | Performed by: INTERNAL MEDICINE

## 2022-08-22 PROCEDURE — 99000 SPECIMEN HANDLING OFFICE-LAB: CPT | Performed by: INTERNAL MEDICINE

## 2022-08-22 PROCEDURE — 3078F DIAST BP <80 MM HG: CPT | Performed by: INTERNAL MEDICINE

## 2022-08-22 PROCEDURE — 99213 OFFICE O/P EST LOW 20 MIN: CPT | Performed by: INTERNAL MEDICINE

## 2022-08-22 PROCEDURE — 3074F SYST BP LT 130 MM HG: CPT | Performed by: INTERNAL MEDICINE

## 2022-08-22 RX ORDER — NORGESTIMATE AND ETHINYL ESTRADIOL 0.25-0.035
1 KIT ORAL DAILY
Qty: 84 TABLET | Refills: 3 | Status: SHIPPED | OUTPATIENT
Start: 2022-08-22

## 2022-08-22 RX ORDER — BUPROPION HYDROCHLORIDE 300 MG/1
300 TABLET ORAL DAILY
Qty: 90 TABLET | Refills: 1 | Status: SHIPPED | OUTPATIENT
Start: 2022-08-22

## 2022-08-25 ENCOUNTER — MED REC SCAN ONLY (OUTPATIENT)
Dept: INTERNAL MEDICINE CLINIC | Facility: CLINIC | Age: 37
End: 2022-08-25

## 2022-09-06 LAB — HPV I/H RISK 1 DNA SPEC QL NAA+PROBE: NEGATIVE

## 2022-10-27 RX ORDER — NORGESTIMATE AND ETHINYL ESTRADIOL 0.25-0.035
1 KIT ORAL DAILY
Qty: 84 TABLET | Refills: 3 | OUTPATIENT
Start: 2022-10-27

## (undated) DIAGNOSIS — Z30.41 ORAL CONTRACEPTIVE USE: ICD-10-CM

## (undated) DIAGNOSIS — F41.8 DEPRESSION WITH ANXIETY: ICD-10-CM

## (undated) NOTE — LETTER
ASTHMA ACTION PLAN for Sharyl Osler     : 1985     Date: 2021  Provider:  Yvonne Ibrahim MD  Phone for doctor or clinic: [facility],  [address] [place] [address], [address] [place] [address] Rhea Weippe [phone]

## (undated) NOTE — LETTER
ASTHMA ACTION PLAN for Vale Enrique     : 1985     Date: 2018  Provider:  Katy Lynch MD  Phone for doctor or clinic: [facility],  [address] [place] [address], [address] [place] [phone]

## (undated) NOTE — LETTER
08/05/21    [facility]    Dear Taryn Garcia records indicate that you have outstanding lab work. To schedule an appointment please call Central Scheduling at [phone].  You may also schedule on [employer] or go on

## (undated) NOTE — LETTER
Date & Time: 4/2/2018, 3:55 PM  Patient: Randee Samuel  Attending Provider:    Sincerely,    Nimisha Thapa MD         To Whom It May Concern:    Jyotsna Salazar was seen and treated in our department on 4/2/2018. She can return to school.     If you have a

## (undated) NOTE — LETTER
06/06/18        [facility]    11/6/1985     Dear Chyna Celeste records indicate that you have outstanding lab work and or testing that was ordered for you and has not yet been completed:        T Pallidum Screenin

## (undated) NOTE — LETTER
ASTHMA ACTION PLAN for Yudelka Agent     : 1985     Date: 2022  Provider:  Thomas Chun MD  Phone for doctor or clinic: [facility],  [facility], [facility] [address]  [phone]    ACT Score: 21      You can use the colors of a traffic light to help learn about your asthma medicines. 1. Green - Go! % of Personal Best Peak Flow Use controller medicine. Breathing is good  No cough or wheeze  Can work and play Medicine How much to take When to take it      No regularly scheduled daily medications        2. Yellow - Caution. 50-79% Personal Best Peak  Flow. Use reliever medicine to keep an asthma attack from getting bad. Cough  Wheezing  Tight Chest  Wake up at night Medicine How much to take When to take it      Proair HFA (albuterol) inhaler 2 puffs every 6 hours as needed         Additional instructions     CONTACT DR'S OFFICE IF SYMPTOMS LAST MORE THAN 24-48 HRS. 3. Red - Stop! Danger!  <50% Personal Best Peak  Flow. Take these medications until  Get help from a doctor   Medicine not helping  Breathing is hard and fast  Nose opens wide  Can't walk  Ribs show  Can't talk well Medicine How much to take When to take it        Proair HFA (albuterol) inhaler 1-2 puffs every 10 minutes as needed on the way to the Emergency Room!!         Additional Instructions              CALL 911 IF NEEDED!!             DO NOT DRIVE YOURSELF!!      [] Asthma Action Plan reviewed with patient (and caregiver if necessary) and a copy of the plan was given to the patient/caregiver. [x] Asthma Action Plan reviewed with patient (and caregiver if necessary) on the phone and mailed copy to patient or submitted via [id number]      Signatures:  Provider  Thomas Chun MD   Patient Caretaker

## (undated) NOTE — LETTER
ASTHMA ACTION PLAN for Gonsalo Baumann     : 1985     Date: 2018  Provider:  Sabina Moses MD  Phone for doctor or clinic: [facility] [address]  [facility] [phone]  [phone]

## (undated) NOTE — LETTER
ASTHMA ACTION PLAN for Zee Ledbetter     : 1985     Date: 2019  Provider:  Mackenzie Crandall MD  Phone for doctor or clinic: [facility] [address]  [address] [facility] [phone]